# Patient Record
Sex: FEMALE | Race: BLACK OR AFRICAN AMERICAN | Employment: UNEMPLOYED | URBAN - METROPOLITAN AREA
[De-identification: names, ages, dates, MRNs, and addresses within clinical notes are randomized per-mention and may not be internally consistent; named-entity substitution may affect disease eponyms.]

---

## 2019-01-29 LAB
ANTIBODY SCREEN, EXTERNAL: NEGATIVE
CHLAMYDIA, EXTERNAL: NEGATIVE
HBSAG, EXTERNAL: NEGATIVE
HCT, EXTERNAL: 35.4
HEPATITIS C AB,   EXT: NEGATIVE
HGB, EXTERNAL: 11.5
HIV, EXTERNAL: NEGATIVE
N. GONORRHEA, EXTERNAL: NEGATIVE
PLATELET CNT,   EXTERNAL: 290
RPR, EXTERNAL: NEGATIVE
RUBELLA, EXTERNAL: NORMAL
TYPE, ABO & RH, EXTERNAL: NORMAL
URINALYSIS, EXTERNAL: NEGATIVE

## 2019-06-06 LAB — GRBS, EXTERNAL: NEGATIVE

## 2019-06-11 ENCOUNTER — HOSPITAL ENCOUNTER (INPATIENT)
Age: 24
LOS: 3 days | Discharge: HOME | DRG: 560 | End: 2019-06-14
Attending: OBSTETRICS & GYNECOLOGY | Admitting: SPECIALIST
Payer: COMMERCIAL

## 2019-06-11 PROBLEM — O40.9XX0 POLYHYDRAMNIOS: Status: ACTIVE | Noted: 2019-06-11

## 2019-06-11 PROBLEM — Z34.90 ENCOUNTER FOR INDUCTION OF LABOR: Status: ACTIVE | Noted: 2019-06-11

## 2019-06-11 PROBLEM — F32.A DEPRESSIVE DISORDER: Status: ACTIVE | Noted: 2019-06-11

## 2019-06-11 PROBLEM — O09.30 LIMITED PRENATAL CARE: Status: ACTIVE | Noted: 2019-06-11

## 2019-06-11 LAB
ABO + RH BLD: NORMAL
BASOPHILS # BLD: 0 K/UL (ref 0–0.1)
BASOPHILS NFR BLD: 0 % (ref 0–2)
BLOOD GROUP ANTIBODIES SERPL: NORMAL
DIFFERENTIAL METHOD BLD: ABNORMAL
EOSINOPHIL # BLD: 0.1 K/UL (ref 0–0.4)
EOSINOPHIL NFR BLD: 1 % (ref 0–5)
ERYTHROCYTE [DISTWIDTH] IN BLOOD BY AUTOMATED COUNT: 13.7 % (ref 11.6–14.5)
HCT VFR BLD AUTO: 34.5 % (ref 35–45)
HGB BLD-MCNC: 11.3 G/DL (ref 12–16)
LYMPHOCYTES # BLD: 1.8 K/UL (ref 0.9–3.6)
LYMPHOCYTES NFR BLD: 26 % (ref 21–52)
MCH RBC QN AUTO: 29 PG (ref 24–34)
MCHC RBC AUTO-ENTMCNC: 32.8 G/DL (ref 31–37)
MCV RBC AUTO: 88.5 FL (ref 74–97)
MONOCYTES # BLD: 0.8 K/UL (ref 0.05–1.2)
MONOCYTES NFR BLD: 11 % (ref 3–10)
NEUTS SEG # BLD: 4.3 K/UL (ref 1.8–8)
NEUTS SEG NFR BLD: 62 % (ref 40–73)
PLATELET # BLD AUTO: 225 K/UL (ref 135–420)
PMV BLD AUTO: 9.3 FL (ref 9.2–11.8)
RBC # BLD AUTO: 3.9 M/UL (ref 4.2–5.3)
SPECIMEN EXP DATE BLD: NORMAL
WBC # BLD AUTO: 7 K/UL (ref 4.6–13.2)

## 2019-06-11 PROCEDURE — 74011250636 HC RX REV CODE- 250/636: Performed by: ADVANCED PRACTICE MIDWIFE

## 2019-06-11 PROCEDURE — 86900 BLOOD TYPING SEROLOGIC ABO: CPT

## 2019-06-11 PROCEDURE — 85025 COMPLETE CBC W/AUTO DIFF WBC: CPT

## 2019-06-11 PROCEDURE — 4A0HXCZ MEASUREMENT OF PRODUCTS OF CONCEPTION, CARDIAC RATE, EXTERNAL APPROACH: ICD-10-PCS | Performed by: SPECIALIST

## 2019-06-11 PROCEDURE — 3E033VJ INTRODUCTION OF OTHER HORMONE INTO PERIPHERAL VEIN, PERCUTANEOUS APPROACH: ICD-10-PCS | Performed by: SPECIALIST

## 2019-06-11 PROCEDURE — 65270000029 HC RM PRIVATE

## 2019-06-11 RX ORDER — LIDOCAINE HYDROCHLORIDE 10 MG/ML
20 INJECTION, SOLUTION EPIDURAL; INFILTRATION; INTRACAUDAL; PERINEURAL AS NEEDED
Status: DISCONTINUED | OUTPATIENT
Start: 2019-06-11 | End: 2019-06-12 | Stop reason: HOSPADM

## 2019-06-11 RX ORDER — SERTRALINE HYDROCHLORIDE 50 MG/1
50 TABLET, FILM COATED ORAL DAILY
COMMUNITY

## 2019-06-11 RX ORDER — MISOPROSTOL 200 UG/1
800 TABLET ORAL
Status: DISCONTINUED | OUTPATIENT
Start: 2019-06-11 | End: 2019-06-12 | Stop reason: HOSPADM

## 2019-06-11 RX ORDER — DIPHENHYDRAMINE HYDROCHLORIDE 50 MG/ML
25 INJECTION, SOLUTION INTRAMUSCULAR; INTRAVENOUS ONCE
Status: COMPLETED | OUTPATIENT
Start: 2019-06-11 | End: 2019-06-11

## 2019-06-11 RX ORDER — SODIUM CHLORIDE, SODIUM LACTATE, POTASSIUM CHLORIDE, CALCIUM CHLORIDE 600; 310; 30; 20 MG/100ML; MG/100ML; MG/100ML; MG/100ML
125 INJECTION, SOLUTION INTRAVENOUS CONTINUOUS
Status: DISCONTINUED | OUTPATIENT
Start: 2019-06-11 | End: 2019-06-12 | Stop reason: HOSPADM

## 2019-06-11 RX ORDER — RANITIDINE 150 MG/1
150 TABLET, FILM COATED ORAL DAILY
Status: DISCONTINUED | OUTPATIENT
Start: 2019-06-12 | End: 2019-06-13

## 2019-06-11 RX ORDER — CARBOPROST TROMETHAMINE 250 UG/ML
250 INJECTION, SOLUTION INTRAMUSCULAR
Status: DISCONTINUED | OUTPATIENT
Start: 2019-06-11 | End: 2019-06-12 | Stop reason: HOSPADM

## 2019-06-11 RX ORDER — NALBUPHINE HYDROCHLORIDE 10 MG/ML
10 INJECTION, SOLUTION INTRAMUSCULAR; INTRAVENOUS; SUBCUTANEOUS
Status: DISCONTINUED | OUTPATIENT
Start: 2019-06-11 | End: 2019-06-12 | Stop reason: HOSPADM

## 2019-06-11 RX ORDER — SERTRALINE HYDROCHLORIDE 50 MG/1
50 TABLET, FILM COATED ORAL EVERY EVENING
Status: DISCONTINUED | OUTPATIENT
Start: 2019-06-11 | End: 2019-06-11

## 2019-06-11 RX ORDER — RANITIDINE 150 MG/1
150 TABLET, FILM COATED ORAL 2 TIMES DAILY
COMMUNITY
End: 2019-06-14

## 2019-06-11 RX ORDER — FAMOTIDINE 20 MG/1
20 TABLET, FILM COATED ORAL DAILY
Status: DISCONTINUED | OUTPATIENT
Start: 2019-06-12 | End: 2019-06-11

## 2019-06-11 RX ORDER — METHYLERGONOVINE MALEATE 0.2 MG/ML
0.2 INJECTION INTRAVENOUS AS NEEDED
Status: COMPLETED | OUTPATIENT
Start: 2019-06-11 | End: 2019-06-12

## 2019-06-11 RX ORDER — OXYTOCIN/RINGER'S LACTATE 20/1000 ML
125 PLASTIC BAG, INJECTION (ML) INTRAVENOUS CONTINUOUS
Status: DISCONTINUED | OUTPATIENT
Start: 2019-06-11 | End: 2019-06-12 | Stop reason: HOSPADM

## 2019-06-11 RX ORDER — TERBUTALINE SULFATE 1 MG/ML
0.25 INJECTION SUBCUTANEOUS
Status: DISCONTINUED | OUTPATIENT
Start: 2019-06-11 | End: 2019-06-12 | Stop reason: HOSPADM

## 2019-06-11 RX ORDER — OXYTOCIN/0.9 % SODIUM CHLORIDE 30/500 ML
0-6 PLASTIC BAG, INJECTION (ML) INTRAVENOUS
Status: DISCONTINUED | OUTPATIENT
Start: 2019-06-11 | End: 2019-06-12

## 2019-06-11 RX ORDER — OXYTOCIN/RINGER'S LACTATE 20/1000 ML
999 PLASTIC BAG, INJECTION (ML) INTRAVENOUS ONCE
Status: ACTIVE | OUTPATIENT
Start: 2019-06-11 | End: 2019-06-12

## 2019-06-11 RX ORDER — SALICYLIC ACID
90 POWDER (GRAM) MISCELLANEOUS ONCE
Status: DISPENSED | OUTPATIENT
Start: 2019-06-11 | End: 2019-06-12

## 2019-06-11 RX ORDER — SERTRALINE HYDROCHLORIDE 50 MG/1
50 TABLET, FILM COATED ORAL DAILY
Status: DISCONTINUED | OUTPATIENT
Start: 2019-06-11 | End: 2019-06-13

## 2019-06-11 RX ADMIN — DIPHENHYDRAMINE HYDROCHLORIDE 25 MG: 50 INJECTION, SOLUTION INTRAMUSCULAR; INTRAVENOUS at 21:27

## 2019-06-11 RX ADMIN — SERTRALINE HYDROCHLORIDE 50 MG: 50 TABLET, FILM COATED ORAL at 21:27

## 2019-06-11 RX ADMIN — SODIUM CHLORIDE, SODIUM LACTATE, POTASSIUM CHLORIDE, AND CALCIUM CHLORIDE 125 ML/HR: 600; 310; 30; 20 INJECTION, SOLUTION INTRAVENOUS at 19:47

## 2019-06-11 RX ADMIN — OXYTOCIN-SODIUM CHLORIDE 0.9% IV SOLN 30 UNIT/500ML 1 MILLI-UNITS/MIN: 30-0.9/5 SOLUTION at 20:20

## 2019-06-11 NOTE — H&P
History & Physical    Name: Juliette Sawyer MRN: [id number]  SSN: [id number]    YOB: 1995  Age: 25 y.o. Sex: female        Subjective:       Estimated Date of Delivery: 19  OB History        2    Para    0    Term    0        0    AB    1    Living    0       SAB    1    TAB    0    Ectopic    0    Molar    0    Multiple    0    Live Births    0             SAB at 8wks    OB HISTORY    Ms. Bobby Johns is admitted with pregnancy at 37w1d for induction of labor. Prenatal course was complicated by fetal growth restriction 6th% at 27wk, 8th% at 37wk per [facility] US; polyhydramnios, MARIIA 10 at 37wks; abnormal uterine artery doppler studies, and depression, on zoloft 50mg since 27wks and followed by therapist. Prenatal care has been followed by Tiffanie Sands starting at [facility]. Total wt gain 25lbs. Admitted to [phone]. She is accompanied by spouse, Crista Jiang. Ana Shin was seen in the office today, [facility] US and recommendations reviewed, and decision to proceed with induction was discussed by Dr. Adilene Owen. Cook balloon and low dose pitocin was explained and pt in agreement. Past Medical History:   Diagnosis Date    Psychiatric problem     depression, taking zoloft     History reviewed. No pertinent surgical history. Social History     Occupational History    Not on file   Tobacco Use    Smoking status: Never Smoker    Smokeless tobacco: Never Used   Substance and Sexual Activity    Alcohol use: Not Currently    Drug use: Never    Sexual activity: Yes     History reviewed. No pertinent family history. No Known Allergies  Prior to Admission medications    Medication Sig Start Date End Date Taking? Authorizing Provider   sertraline (ZOLOFT) 50 mg tablet Take 50 mg by mouth daily. Yes Provider, Historical   raNITIdine (ZANTAC) 150 mg tablet Take 150 mg by mouth two (2) times a day. Yes Provider, Historical   PNV No12-Iron-FA-DSS-OM-3 29 mg iron-1 mg -50 mg CPKD Take  by mouth.    Yes Provider, Historical        Review of Systems: Pertinent items are noted in HPI. Objective:     Vitals:  Vitals:    19 1823 19 1831   BP: 120/76    Pulse: 98    Resp: 17    Temp: 98.6 °F (37 °C)    SpO2: 99%    Weight:  81.6 kg (180 lb)   Height:  5' 1\" (1.549 m)        Physical Exam:  Patient in no acute distress  Abdomen: Gravid, nontender  Cervix: 2/100/-3   FHR:Baseline: 140 per minute  Variability: moderate  Accelerations: yes  Decelerations: none  Contractions: irregular  EFW  4.5# by Leopold's    Prenatal Labs:   No results found for: RUBELLAEXT, GRBSEXT, HBSAGEXT, HIVEXT, RPREXT, GONNOEXT, CHLAMEXT    Group B Strep was negative. Assessment/Plan:   Assessment: IUP @ 37w1d; FHT Category ; Vertex presentation. Patient Active Problem List   Diagnosis Code    Polyhydramnios O40. 9XX0    Fetal growth restriction [id number]    Depressive disorder F32.9    Limited prenatal care O09.30    Encounter for induction of labor Z34.90       Plan: Admit for IOL for IUGR and polyhydramnios. Sami Lucio PIV LR and labs. Cook balloon and low dose pitocin titrated per protocol for up to 12hours. Epidural as desired. Anticipate . Dr. Narendra Beatty notified.     Signed By:  Rocky Robertson CNM     2019 6:50 PM

## 2019-06-11 NOTE — PROGRESS NOTES
1815- well nourished, ambulatory patient arrived on unit for scheduled induction. Oriented patient to room. Monitors applied. Vitals stable. patient denies headache, blurry vision, leaking of fluid or bleeding. Side rails up call bell in reach. 1835- blood specimens sent to lab. IV started. patient tolerated well. 1908- Verbal shift change report given to RL Newman RN (oncoming nurse) by Rigo Page RN (offgoing nurse). Report included the following information Intake/Output, MAR, Recent Results and Med Rec Status. 1910- notified [facility] about reflexes.

## 2019-06-11 NOTE — PROGRESS NOTES
Faisal Sampson  elected to proceed with cervical ripening tonight. Reviewed placement of balloon, mechanics of agent and intended effect, R/B/A. Vertex presentation confirmed by bedside US. Rhonda Bowen requesting medication for sleep. Physical Exam:  Cervical Exam: 2/100/-3, soft, bulging bag  Membranes:  Intact  Uterine Activity:   Fetal Heart Rate: Baseline: 140 per minute  Variability: moderate  Accelerations: yes  Decelerations: none     Assessment: IUP 39w6d; FHR Category I; IOL for IUGR and poly  . Plan: During digital cervical exam, cook balloon threaded into cervix manually with stylet. 40cc NS placed in uterine balloon. Vaginal ballon visible. 40cc placed in vaginal balloon. This was repeated for the uterine balloon and then vaginal balloon for total of 60/60 cc. Pt and fetus tolerated well. Low dose pitocin titration ordered. Plan to remove balloon in 12 hours and restart pitocin at that time. Dr. Amadeo Soulier given report.

## 2019-06-11 NOTE — PROGRESS NOTES
Bedside and Verbal shift change report given to Rodri Alexis RN (oncoming nurse) by Leon Bernstein. Yannick Leslie (offgoing nurse). Report included the following information SBAR, Kardex, Intake/Output, MAR and Recent Results. 1926-- CNM at bedside w/ ultrasound; confirmed vertex  1933-- SVE: 2/100/-3  1937-- Cook balloon inserted by CNM; inflated to 60/60 w/ sterile fluid  0238-- SROM for copious clear fluid  0240-- cook balloon fell out onto floor  0256-- SVE: 4/80/-2. Updated CNM on SROM, balloon fell out, and SVE with early decelerations and variables in FHR. CNM states putting in a new Pitocin order and pt may have epidural when she desires. 0335-- Pt requests epidural; bolus started at this time  0345-- nausea/vomiting  0350-- RN remains at bedside adjusting EFM, called for second RN, bolus started, changing positions  0354-- O2 applied via non-rebreather mask at 10L/min; called for CNM  0356-- MELISSA Morel at bedside for SVE: 5/100/-2. CNM made aware fluid bolus in progress in preparation for epidural. O2 removed at this time  0358-- Pitocin turned off (see MAR)  0405-- nausea/vomiting   0415-- Notified CRNA of pt's desire for epidural  0429-- HODA Longo at bedside for epidural placement  0431-- time out  0440-- epidural line in place and test dose given by CRNA  8914-- Repositioning patient d/t deep variables; second RN at bedside; O2 reapplied via non-rebreather mask at 10L/min  0335-- CNM at bedside for SVE: pt complete. CNM states infant's head is still high, will reposition and attempt to let the head come down further before pushing. 0540-- IFM placed by CNM for difficulty tracing EFM. 9204-- turn to left side  0542-- turn to right side  0543-- RN and CNM assist pt to hands and knees  0545-- Shonda Bautista RN paging Dr. Zenon Tony per CARLOS DINHM's request.  5350-- Dr. Zenon Tony at bedside  0997-- terbutaline given per providers' request (see MAR)  Emma Kohler-- Dr. Zenon Tony rotating infant.  Called for Nursery and Peds  0602-- vacuum applied by Dr. Jcarlos Vergara. Vacuum removed by Dr. Jcarlos Vergara. Total time of application 40 seconds. Nursery RN and Pediatrician at bedside  1617-- Tight nuchal cord clamped and cut by Dr. Jcarlos Vergara.  of viable female infant. Infant to warmer to be assessed by Peds  0610-- methergine given per Dr. Aguirre Paskenta request  0329-- Two 10unit vials added to  bag of postpartum pitocin (see MAR)  0615-- Dr. Jcarlos Vergara states . Perineum intact  0620-- Fundus firm, lochia small  1620-- heavy gush of lochia and clots with fundal expression. Fundus remains firm. 2612-- fundus firm; lochia small-moderate  0707-- Bedside and Verbal shift change report given to RUPERTO Boland RN (oncoming nurse) by Yessi Dejesus RN (offgoing nurse). Report included the following information SBAR, Kardex, Intake/Output, MAR and Recent Results.

## 2019-06-12 ENCOUNTER — ANESTHESIA EVENT (OUTPATIENT)
Dept: LABOR AND DELIVERY | Age: 24
DRG: 560 | End: 2019-06-12
Payer: COMMERCIAL

## 2019-06-12 ENCOUNTER — ANESTHESIA (OUTPATIENT)
Dept: LABOR AND DELIVERY | Age: 24
DRG: 560 | End: 2019-06-12
Payer: COMMERCIAL

## 2019-06-12 PROCEDURE — 77010026065 HC OXYGEN MINIMUM MEDICAL AIR

## 2019-06-12 PROCEDURE — 59200 INSERT CERVICAL DILATOR: CPT

## 2019-06-12 PROCEDURE — 65270000029 HC RM PRIVATE

## 2019-06-12 PROCEDURE — 76060000078 HC EPIDURAL ANESTHESIA

## 2019-06-12 PROCEDURE — 88307 TISSUE EXAM BY PATHOLOGIST: CPT

## 2019-06-12 PROCEDURE — 75410000000 HC DELIVERY VAGINAL/SINGLE

## 2019-06-12 PROCEDURE — 74011250636 HC RX REV CODE- 250/636: Performed by: ADVANCED PRACTICE MIDWIFE

## 2019-06-12 PROCEDURE — 75410000002 HC LABOR FEE PER 1 HR

## 2019-06-12 PROCEDURE — 74011000250 HC RX REV CODE- 250

## 2019-06-12 PROCEDURE — 77030034849

## 2019-06-12 PROCEDURE — 75410000003 HC RECOV DEL/VAG/CSECN EA 0.5 HR

## 2019-06-12 PROCEDURE — 77030007879 HC KT SPN EPDRL TELE -B: Performed by: NURSE ANESTHETIST, CERTIFIED REGISTERED

## 2019-06-12 PROCEDURE — 74011250636 HC RX REV CODE- 250/636

## 2019-06-12 PROCEDURE — 74011250636 HC RX REV CODE- 250/636: Performed by: NURSE ANESTHETIST, CERTIFIED REGISTERED

## 2019-06-12 RX ORDER — OXYTOCIN 10 [USP'U]/ML
INJECTION, SOLUTION INTRAMUSCULAR; INTRAVENOUS
Status: COMPLETED
Start: 2019-06-12 | End: 2019-06-12

## 2019-06-12 RX ORDER — ZOLPIDEM TARTRATE 5 MG/1
5 TABLET ORAL
Status: DISCONTINUED | OUTPATIENT
Start: 2019-06-12 | End: 2019-06-14 | Stop reason: HOSPADM

## 2019-06-12 RX ORDER — OXYTOCIN 10 [USP'U]/ML
10 INJECTION, SOLUTION INTRAMUSCULAR; INTRAVENOUS ONCE
Status: COMPLETED | OUTPATIENT
Start: 2019-06-12 | End: 2019-06-12

## 2019-06-12 RX ORDER — OXYCODONE AND ACETAMINOPHEN 5; 325 MG/1; MG/1
2 TABLET ORAL
Status: DISCONTINUED | OUTPATIENT
Start: 2019-06-12 | End: 2019-06-14 | Stop reason: HOSPADM

## 2019-06-12 RX ORDER — SODIUM CHLORIDE 0.9 % (FLUSH) 0.9 %
5-40 SYRINGE (ML) INJECTION AS NEEDED
Status: DISCONTINUED | OUTPATIENT
Start: 2019-06-12 | End: 2019-06-12 | Stop reason: HOSPADM

## 2019-06-12 RX ORDER — AMOXICILLIN 250 MG
1 CAPSULE ORAL
Status: DISCONTINUED | OUTPATIENT
Start: 2019-06-12 | End: 2019-06-14 | Stop reason: HOSPADM

## 2019-06-12 RX ORDER — LIDOCAINE HYDROCHLORIDE AND EPINEPHRINE 15; 5 MG/ML; UG/ML
INJECTION, SOLUTION EPIDURAL
Status: COMPLETED | OUTPATIENT
Start: 2019-06-12 | End: 2019-06-12

## 2019-06-12 RX ORDER — PROMETHAZINE HYDROCHLORIDE 25 MG/ML
25 INJECTION, SOLUTION INTRAMUSCULAR; INTRAVENOUS
Status: DISCONTINUED | OUTPATIENT
Start: 2019-06-12 | End: 2019-06-14 | Stop reason: HOSPADM

## 2019-06-12 RX ORDER — IBUPROFEN 400 MG/1
800 TABLET ORAL
Status: DISCONTINUED | OUTPATIENT
Start: 2019-06-12 | End: 2019-06-14 | Stop reason: HOSPADM

## 2019-06-12 RX ORDER — LIDOCAINE HYDROCHLORIDE AND EPINEPHRINE 15; 5 MG/ML; UG/ML
INJECTION, SOLUTION EPIDURAL AS NEEDED
Status: DISCONTINUED | OUTPATIENT
Start: 2019-06-12 | End: 2019-06-12

## 2019-06-12 RX ORDER — BUPIVACAINE HYDROCHLORIDE 2.5 MG/ML
INJECTION, SOLUTION EPIDURAL; INFILTRATION; INTRACAUDAL AS NEEDED
Status: DISCONTINUED | OUTPATIENT
Start: 2019-06-12 | End: 2019-06-12 | Stop reason: HOSPADM

## 2019-06-12 RX ORDER — OXYTOCIN/0.9 % SODIUM CHLORIDE 30/500 ML
0-20 PLASTIC BAG, INJECTION (ML) INTRAVENOUS
Status: DISCONTINUED | OUTPATIENT
Start: 2019-06-12 | End: 2019-06-12 | Stop reason: HOSPADM

## 2019-06-12 RX ORDER — PHENYLEPHRINE HCL IN 0.9% NACL 0.4MG/10ML
100 SYRINGE (ML) INTRAVENOUS AS NEEDED
Status: DISCONTINUED | OUTPATIENT
Start: 2019-06-12 | End: 2019-06-12 | Stop reason: HOSPADM

## 2019-06-12 RX ORDER — ACETAMINOPHEN 325 MG/1
650 TABLET ORAL
Status: DISCONTINUED | OUTPATIENT
Start: 2019-06-12 | End: 2019-06-14 | Stop reason: HOSPADM

## 2019-06-12 RX ORDER — SODIUM CHLORIDE 0.9 % (FLUSH) 0.9 %
5-40 SYRINGE (ML) INJECTION EVERY 8 HOURS
Status: DISCONTINUED | OUTPATIENT
Start: 2019-06-12 | End: 2019-06-12 | Stop reason: HOSPADM

## 2019-06-12 RX ORDER — FENTANYL CITRATE 50 UG/ML
100 INJECTION, SOLUTION INTRAMUSCULAR; INTRAVENOUS ONCE
Status: COMPLETED | OUTPATIENT
Start: 2019-06-12 | End: 2019-06-12

## 2019-06-12 RX ORDER — FENTANYL CITRATE 50 UG/ML
INJECTION, SOLUTION INTRAMUSCULAR; INTRAVENOUS
Status: COMPLETED
Start: 2019-06-12 | End: 2019-06-12

## 2019-06-12 RX ADMIN — FENTANYL CITRATE 100 MCG: 50 INJECTION, SOLUTION INTRAMUSCULAR; INTRAVENOUS at 04:42

## 2019-06-12 RX ADMIN — OXYTOCIN 10 UNITS: 10 INJECTION, SOLUTION INTRAMUSCULAR; INTRAVENOUS at 06:12

## 2019-06-12 RX ADMIN — SODIUM CHLORIDE, SODIUM LACTATE, POTASSIUM CHLORIDE, AND CALCIUM CHLORIDE 1000 ML: 600; 310; 30; 20 INJECTION, SOLUTION INTRAVENOUS at 04:19

## 2019-06-12 RX ADMIN — LIDOCAINE HYDROCHLORIDE AND EPINEPHRINE 3.5 ML: 15; 5 INJECTION, SOLUTION EPIDURAL at 04:40

## 2019-06-12 RX ADMIN — SODIUM CHLORIDE, SODIUM LACTATE, POTASSIUM CHLORIDE, AND CALCIUM CHLORIDE 125 ML/HR: 600; 310; 30; 20 INJECTION, SOLUTION INTRAVENOUS at 05:56

## 2019-06-12 RX ADMIN — METHYLERGONOVINE MALEATE 0.2 MG: 0.2 INJECTION, SOLUTION INTRAMUSCULAR; INTRAVENOUS at 06:13

## 2019-06-12 RX ADMIN — Medication 10 ML/HR: at 04:47

## 2019-06-12 RX ADMIN — Medication 125 ML/HR: at 06:12

## 2019-06-12 RX ADMIN — SODIUM CHLORIDE, SODIUM LACTATE, POTASSIUM CHLORIDE, AND CALCIUM CHLORIDE 125 ML/HR: 600; 310; 30; 20 INJECTION, SOLUTION INTRAVENOUS at 03:19

## 2019-06-12 RX ADMIN — TERBUTALINE SULFATE 0.25 MG: 1 INJECTION SUBCUTANEOUS at 05:57

## 2019-06-12 RX ADMIN — BUPIVACAINE HYDROCHLORIDE 8 ML: 2.5 INJECTION, SOLUTION EPIDURAL; INFILTRATION; INTRACAUDAL at 04:38

## 2019-06-12 NOTE — ANESTHESIA POSTPROCEDURE EVALUATION
* No procedures listed *.    epidural    Anesthesia Post Evaluation      Multimodal analgesia: multimodal analgesia not used between 6 hours prior to anesthesia start to PACU discharge  Patient location during evaluation: bedside  Patient participation: complete - patient participated  Level of consciousness: awake  Pain score: 0  Pain management: adequate  Airway patency: patent  Anesthetic complications: no  Cardiovascular status: acceptable  Respiratory status: acceptable  Hydration status: acceptable  Post anesthesia nausea and vomiting:  none      No vitals data found for the desired time range.

## 2019-06-12 NOTE — PROGRESS NOTES
Labor Progress Note  Patient seen, fetal heart rate and contraction pattern evaluated, and patient examined. Report from RN of SROM, copious amount of clear fluid and removal of cook balloon. Patient Vitals for the past 1 hrs:   Temp   19 0256 98 °F (36.7 °C)       Physical Exam:  Cervical Exam:  5/100/-2/   Membranes:  Spontaneous Rupture of Membranes; Amniotic Fluid: clear fluid  Uterine Activity: Frequency: Every 2 minutes and Duration: 60 seconds  Fetal Heart Rate: Baseline: 140 per minute  Variability: moderate  Accelerations: no  Decelerations: early    Assessment: IUP 37w2d; FHR Category I; IOL for IUGR and poly  Plan: Continue to monitor for maternal and fetal wellbeing, pitocin titrated per protocol, epidural as desired, anticipate .   Bianka Pollack CNM  2019  3:52 AM

## 2019-06-12 NOTE — ANESTHESIA PREPROCEDURE EVALUATION
Relevant Problems   No relevant active problems       Anesthetic History   No history of anesthetic complications            Review of Systems / Medical History  Patient summary reviewed, nursing notes reviewed and pertinent labs reviewed    Pulmonary  Within defined limits                 Neuro/Psych   Within defined limits           Cardiovascular  Within defined limits                     GI/Hepatic/Renal  Within defined limits              Endo/Other  Within defined limits           Other Findings              Physical Exam    Airway  Mallampati: II  TM Distance: 4 - 6 cm  Neck ROM: normal range of motion   Mouth opening: Normal     Cardiovascular  Regular rate and rhythm,  S1 and S2 normal,  no murmur, click, rub, or gallop  Rhythm: regular  Rate: normal         Dental  No notable dental hx       Pulmonary  Breath sounds clear to auscultation               Abdominal  Abdominal exam normal       Other Findings            Anesthetic Plan    ASA: 2  Anesthesia type: epidural            Anesthetic plan and risks discussed with: Patient and Spouse

## 2019-06-12 NOTE — PROGRESS NOTES
Bedside and Verbal shift change report given to Reuben (oncoming nurse) by Cj Turk (offgoing nurse). Report included the following information SBAR, Kardex, Procedure Summary, Intake/Output, MAR, Recent Results and Med Rec Status. 0800- large amount of urine expressed with fundal massage. 0935- Pt up to bathroom, unable to void at this time. Pericare done with instruction. Pad and gown changed.  Pt transferred to 3412-   1000- pt to nursery to visit with baby

## 2019-06-12 NOTE — ANESTHESIA PROCEDURE NOTES
Epidural Block    Start time: 6/12/2019 4:25 AM  End time: 6/12/2019 4:45 AM  Performed by: Leatha Ha CRNA  Authorized by: Leatha Ha CRNA     Pre-Procedure  Indication: labor epidural    Preanesthetic Checklist: patient identified, risks and benefits discussed, anesthesia consent, site marked, patient being monitored, timeout performed and anesthesia consent    Timeout Time: 04:31        Epidural:   Patient position:  Seated  Prep region:  Lumbar  Prep: Betadine    Location:  L3-4    Needle and Epidural Catheter:   Needle Type:  Tuohy  Needle Gauge:  18 G  Injection Technique:  Loss of resistance using air  Attempts:  1  Catheter Size:  19 G  Catheter at Skin Depth (cm):  8  Depth in Epidural Space (cm):  4  Events: no blood with aspiration, no cerebrospinal fluid with aspiration, no paresthesia and negative aspiration test    Test Dose:  Negative    Assessment:   Catheter Secured:  Tegaderm and tape  Insertion:  Uncomplicated  Patient tolerance:  Patient tolerated the procedure well with no immediate complications  Fentanyl 474 mcg via Epidural cath given.

## 2019-06-12 NOTE — L&D DELIVERY NOTE
[address] for Birth   Delivery Note    Patient's Name:  Wanda Dillard. MRN: [id number]. Patient's : 1995. Age: 25 y. o. Date of Service:  2019  6:52 AM    Physician:  Claudis Phalen, MD    Delivered at 37w2d weeks gestation. Known IUGR patient. Called to review strip. Intermittent bradycardia with good variability noted for 45 minutes. Exam revealed Right Occiput posterior. Terbutal given and patient in knee chest.  Repositioned patient to Gita position. confirmed ROP and manually rotated to ANA CRISTINA. Vacuum applied and with approximately 5 pushing efforts patient delivered vigorous female infant over intact perineum. Peds in room. Nuchal cord tight, clamped and cut prior to delivery. Postpartum bleeding heavy managed with doubling pitocin, massage and 0.2 mg IM methergine. DELIVERY SUMMARY:  Information for the patient's :  Ashlyn Mehul [[id number]]     Delivery Type: Vaginal, Vacuum (Extractor)   Delivery Date: 2019   Delivery Time: 6:03 AM     Birth Weight:       Sex:  female  Delivery Clinician:  Ron Bonilla   Gestational Age: 42w2d    Presentation: Vertex   Position:             Apgars were   and       Resuscitation Method:       Meconium Stained:      Living Status:         Placenta Date/Time:     Placenta Removal: Spontaneous   Placenta Appearance: Normal;Intact    Cord Information: 3 Vessels    Cord Events: Nuchal Cord With Compressions       Disposition of Cord Blood:      Blood Gases Sent?:        Episiotomy:no  Laceration:no  Repair:no  Epidural:yes    Vital Signs and Lab Data:   Visit Vitals  BP 99/51   Pulse 81   Temp 98.2 °F (36.8 °C)   Resp 17   Ht 5' 1\" (1.549 m)   Wt 81.6 kg (180 lb)   SpO2 99%   Breastfeeding?  No   BMI 34.01 kg/m²       Temp (24hrs), Av.4 °F (36.9 °C), Min:98 °F (36.7 °C), Max:98.7 °F (37.1 °C)      WBC   Date/Time Value Ref Range Status   2019 07:35 PM 7.0 4.6 - 13.2 K/uL Final     HGB   Date/Time Value Ref Range Status   06/11/2019 07:35 PM 11.3 (L) 12.0 - 16.0 g/dL Final     PLATELET   Date/Time Value Ref Range Status   06/11/2019 07:35  135 - 420 K/uL Final     Hgb, External   Date/Time Value Ref Range Status   01/29/2019 11.5  Final     Platelet cnt., External   Date/Time Value Ref Range Status   01/29/2019 290  Final       Prenatal Labs:  Lab Results   Component Value Date/Time    ABO/Rh(D) A POSITIVE 06/11/2019 07:35 PM    ABO,Rh A positive  01/29/2019    Rubella, External immune  01/29/2019    GrBStrep, External negative 06/06/2019         Plan: Normal Postpartum Care    Attending Attestation: I was present and scrubbed for the entire procedure    Signed By: Sukhwinder Meléndez MD     June 12, 2019

## 2019-06-12 NOTE — PROGRESS NOTES
Labor Progress Note  Called by RN to evaluate variable decels. Patient seen, fetal heart rate and contraction pattern evaluated, patient examined. No data found. Physical Exam:  Cervical Exam:  10/100/0  Membranes:  Spontaneous Rupture of Membranes; Amniotic Fluid: clear fluid  Uterine Activity: Frequency: Every 3 minutes and Duration: 60 seconds  Fetal Heart Rate: Baseline: 125 per minute  Variability: moderate  Accelerations: yes  Decelerations: variable decels to 70-80bpm and prolonged, intermittent return to baseline with mod variability  FSE applied    Assessment: IUP 37w2d; FHR Category III; IOL for IUGR and poly. O2 and fluid bolus. Plan: Maternal positioning in H&K, terbutaline given.  Dr. Annamarie Klein paged to assess for PCS vs VAVD  Raven Mead CNM  6/12/2019  6:21 AM

## 2019-06-12 NOTE — PROGRESS NOTES
Labor Progress Note  Patient resting, fetal heart rate and contraction pattern evaluated. Exam deferred. Patient Vitals for the past 4 hrs:   Pulse BP   19 2200 72 129/88   19 2100 70 126/81   19 2000 78 112/69         Physical Exam:  Cervical Exam:  2/100 %/-3/   Membranes:  Intact  Uterine Activity: Frequency: Every 4-5 minutes and Duration: 30-60 seconds  Fetal Heart Rate: Baseline: 140 per minute  Variability: moderate  Accelerations: yes  Decelerations: none    Assessment: IUP 37w1d; FHR Category I; IOL for IUGR and poly  Plan: Continue to monitor for maternal and fetal wellbeing, pitocin titrated per protocol, cook balloon overnight, anticipate .   Klaus Rogel CNM  2019  11:50 PM

## 2019-06-12 NOTE — PROGRESS NOTES
80: Paged Dr Maddie Cadet at request of [facility]. 0619: Dr Maddie Cadet returned page, made aware, will be by to evaluate.

## 2019-06-12 NOTE — LACTATION NOTE
Baby went to [facility] after birth for distress but is now in the room with mom. Baby is SGA and did have a small amount of formula this morning. Mom states she plans to do a breast/formula combination and when home may formula feed only. Baby was positioned football on left but baby did not wake to nurse. Reviewed  feeding patterns/expectations in the first 24 hours, positioning, colostrum, latch. Encouraged mom to ask for assistance when baby wakes and is ready to nurse.

## 2019-06-12 NOTE — ROUTINE PROCESS
TRANSFER - IN REPORT: 
 
Verbal report received from Arabella Maddox RN (name) on Daija Polo  being received from L&D (unit) for routine progression of care Report consisted of patients Situation, Background, Assessment and  
Recommendations(SBAR). Information from the following report(s) SBAR, Procedure Summary, Accordion and Recent Results was reviewed with the receiving nurse. Opportunity for questions and clarification was provided. Assessment completed upon patients arrival to unit and care assumed. 1040 - RN introduced self to pt. Pt resting in bed. Pt denies question/concerns/pain. Will continue to monitor.

## 2019-06-12 NOTE — PROGRESS NOTES
I received a case summary earlier this evening. I have reviewed the chart and agree with the documentation recorded by the Midwife, including the assessment and current treatment plan.     David Almonte MD  [facility]  Pager: [phone]  Office: [phone]  June 11, 2019 10:57 PM

## 2019-06-12 NOTE — PROGRESS NOTES
Visited with mother and acquired permission to announce baby's name.     Nathalie Mott   Spiritual Care   [phone]

## 2019-06-13 LAB
HCT VFR BLD AUTO: 32.4 % (ref 35–45)
HGB BLD-MCNC: 10.7 G/DL (ref 12–16)

## 2019-06-13 PROCEDURE — 36415 COLL VENOUS BLD VENIPUNCTURE: CPT

## 2019-06-13 PROCEDURE — 65270000029 HC RM PRIVATE

## 2019-06-13 PROCEDURE — 85018 HEMOGLOBIN: CPT

## 2019-06-13 PROCEDURE — 85014 HEMATOCRIT: CPT

## 2019-06-13 PROCEDURE — 74011250637 HC RX REV CODE- 250/637: Performed by: SPECIALIST

## 2019-06-13 RX ORDER — RANITIDINE 150 MG/1
150 TABLET, FILM COATED ORAL DAILY PRN
Status: DISCONTINUED | OUTPATIENT
Start: 2019-06-13 | End: 2019-06-14 | Stop reason: HOSPADM

## 2019-06-13 RX ORDER — SERTRALINE HYDROCHLORIDE 50 MG/1
50 TABLET, FILM COATED ORAL
Status: DISCONTINUED | OUTPATIENT
Start: 2019-06-13 | End: 2019-06-14 | Stop reason: HOSPADM

## 2019-06-13 RX ADMIN — IBUPROFEN 800 MG: 400 TABLET ORAL at 12:48

## 2019-06-13 RX ADMIN — ACETAMINOPHEN 650 MG: 325 TABLET, FILM COATED ORAL at 19:52

## 2019-06-13 RX ADMIN — SERTRALINE HYDROCHLORIDE 50 MG: 50 TABLET, FILM COATED ORAL at 23:21

## 2019-06-13 RX ADMIN — IBUPROFEN 800 MG: 400 TABLET ORAL at 01:21

## 2019-06-13 RX ADMIN — IBUPROFEN 800 MG: 400 TABLET ORAL at 22:40

## 2019-06-13 NOTE — ROUTINE PROCESS
Bedside and Verbal shift change report given to Andrzej Bojorquez RN (oncoming nurse) by Brian Mccoy RN (offgoing nurse). Report included the following information SBAR, Procedure Summary, Intake/Output, MAR and Recent Results.

## 2019-06-13 NOTE — ROUTINE PROCESS
Bedside and Verbal shift change report given to Gwen 812 (oncoming nurse) by Sri Cannon RN (offgoing nurse). Report included the following information SBAR, Intake/Output, MAR and Recent Results. 2010: Assumed care of pt resting supine in bed. Significant other at bedside. Introductions, and reveiwed plan of care for this shift. Assessment and vital signs within defined limits. Iv saline locked in left wrist wnl. Call bell on bedside, top side rails up x2, bed in lowest position. Denies needs or complaints at this time. No apparent distress noted. 2214: Ambulating in room steady gait noted, id band verifeid with infant's,. Denies needs or complaints at this time. No distress noted. 2239: Po zoloft admin per orders see mar. Denies needs or c/o at this time. 0030: Assisted with breast feeding: Infant placed in cross cradle hold: infant successfully latched and nursing. Pt denies needs or c/o nad noted. 0120: C/o pain medicated with 800mg of motrin per orders see mar. Denies needs or c/o at this time. S/o at bedside. 0400: Ambulated to bathroom with steady gait, voided, returned to bed without difficulty. Denies needs or c/o at this time. S/o at bedside. 9792: Sleeping supine respirations unlabored. S/o at bedside. No apparent distress noted. Assessment and vital signs within defined limits. Voiding and ambulating without difficulty. Pain controlled by motrin . Shift Report: Kelvin Brasher RN Pt condition: Stable

## 2019-06-13 NOTE — PROGRESS NOTES
Progress Note    Patient: Wanda Dillard MRN: [id number]  SSN: [id number]    YOB: 1995  Age: 25 y.o. Sex: female      Subjective:       Pt denies complaints. Ambulating, voiding, tolerating regular diet, minimal lochia, pain controlled with po pain meds. Breast and bottle tfeeding without difficulty. Objective:      Patient Vitals for the past 12 hrs:   Temp Pulse Resp BP SpO2   06/13/19 0400 98.2 °F (36.8 °C) 76 20 132/82 99 %       Physical Exam:  General:  Alert and oriented, no apparent distress  Abdomen:  Soft, non distended, non tender to palpation, firm fundus below umbilicus  Lower extremities bilaterally:  Non tender to palpation, no edema/cyanosis/clubbing/redness    Lab/Data Review:  Recent Results (from the past 24 hour(s))   HEMOGLOBIN    Collection Time: 06/13/19  6:50 AM   Result Value Ref Range    HGB 10.7 (L) 12.0 - 16.0 g/dL   HEMATOCRIT    Collection Time: 06/13/19  6:50 AM   Result Value Ref Range    HCT 32.4 (L) 35.0 - 45.0 %        Assessment:     25 y.o. Silvia Bonilla PPD#1 s/p VAVD    Plan:     - Postpartum care discussed including diet, ambulation, and actvitiy restrictions. - Continue to monitor closely, likely DC in am  - Recovering well.         Signed By: Marjorie Goins MD     June 13, 2019

## 2019-06-13 NOTE — ROUTINE PROCESS
Bedside and Verbal shift change report given to JAMIL Berry (oncoming nurse) by Lindy Gonzalez. Yue Carter RN (offgoing nurse). Report included the following information SBAR, Kardex, MAR and Recent Results.

## 2019-06-13 NOTE — LACTATION NOTE
Mother states baby did nurse during the evening. There was a bottle of formula at bedside so asked mom is she was doing breast and bottle and she stated yes but she told the nurse she wanted to breastfeed only. Unsure of her plan but offered assistance if she wants to nurse.

## 2019-06-14 VITALS
HEIGHT: 61 IN | BODY MASS INDEX: 33.99 KG/M2 | RESPIRATION RATE: 16 BRPM | SYSTOLIC BLOOD PRESSURE: 114 MMHG | OXYGEN SATURATION: 100 % | HEART RATE: 87 BPM | DIASTOLIC BLOOD PRESSURE: 69 MMHG | TEMPERATURE: 97.4 F | WEIGHT: 180 LBS

## 2019-06-14 PROBLEM — O40.9XX0 POLYHYDRAMNIOS: Status: RESOLVED | Noted: 2019-06-11 | Resolved: 2019-06-14

## 2019-06-14 PROBLEM — Z34.90 ENCOUNTER FOR INDUCTION OF LABOR: Status: RESOLVED | Noted: 2019-06-11 | Resolved: 2019-06-14

## 2019-06-14 PROBLEM — O09.30 LIMITED PRENATAL CARE: Status: RESOLVED | Noted: 2019-06-11 | Resolved: 2019-06-14

## 2019-06-14 PROBLEM — F32.A DEPRESSIVE DISORDER: Status: RESOLVED | Noted: 2019-06-11 | Resolved: 2019-06-14

## 2019-06-14 PROCEDURE — 74011250637 HC RX REV CODE- 250/637: Performed by: SPECIALIST

## 2019-06-14 RX ORDER — IBUPROFEN 800 MG/1
800 TABLET ORAL
Qty: 60 TAB | Refills: 0 | Status: SHIPPED | OUTPATIENT
Start: 2019-06-14

## 2019-06-14 RX ORDER — SERTRALINE HYDROCHLORIDE 50 MG/1
50 TABLET, FILM COATED ORAL
Qty: 30 TAB | Refills: 3 | Status: SHIPPED | OUTPATIENT
Start: 2019-06-14

## 2019-06-14 RX ADMIN — ACETAMINOPHEN 650 MG: 325 TABLET, FILM COATED ORAL at 04:40

## 2019-06-14 NOTE — LACTATION NOTE
Mom states she is breast and bottle feeding baby. Mom gave formula last night after nursing for 40 minutes because baby was showing signs of hunger. Discussed cluster feeding, sucking needs, size of stomach. Mom states breastfeeding \"hurts\". Discussed positions and sore nipple management - mom can express drops of colostrum. Daily log given and reviewed. Info sheet, resource list given, encouraged to call with questions.

## 2019-06-14 NOTE — DISCHARGE SUMMARY
Obstetrical Discharge Summary       STEVEN Nicholas Zavaleta MD  [address]   [address]  [address] [facility]  [phone]        Patient ID:Leena DELUCAQRORIN,[id number],9 y.o.,1995    Postpartum Day: Information for the patient's :  Sidney Wilcox [[id number]]   3 days       Admit Date: 2019    Discharge Date: 2019     Admitting Physician: Anca Souza MD     Admission Diagnoses: Encounter for induction of labor [Z34.90]    Discharge Diagnoses: same as above      Additional Diagnoses:Present on Admission:   (Resolved) Limited prenatal care       Procedure:        Baby link  Information for the patient's :  Sidney Wilcox [[id number]]     Delivery Type: Vaginal, Vacuum (Extractor)   Delivery Date: 2019   Delivery Time: 6:03 AM     Birth Weight: 2.23 kg     Sex:  female  Delivery Clinician:  Brandy Hines   Gestational Age: 42w2d    Presentation: Vertex   Position:             Apgars were 4  and 6      Resuscitation Method: C-PAP;Suctioning-bulb;Suctioning-deep; Tactile Stimulation     Meconium Stained: None    Living Status: Living       Placenta Date/Time: 2019  6:07 AM   Placenta Removal: Spontaneous   Placenta Appearance: Normal;Intact    Cord Information: 3 Vessels    Cord Events: Nuchal Cord With Compressions       Disposition of Cord Blood:      Blood Gases Sent?:            Baby procedures:    Information for the patient's newbornDriscilla Jim [[id number]]          Feeding Method: Infant Feeding: Breastmilk, Formula    Immunizations:    Immunization History   Administered Date(s) Administered    DTaP 2019        Immunizations:    Immunization History   Administered Date(s) Administered    DTaP 2019       Group Beta Strep:   GrBStrep, External   Date Value Ref Range Status   2019 negative  Final          WBC   Date/Time Value Ref Range Status   2019 07:35 PM 7.0 4.6 - 13.2 K/uL Final     HGB   Date/Time Value Ref Range Status   2019 06:50 AM 10.7 (L) 12.0 - 16.0 g/dL Final   2019 07:35 PM 11.3 (L) 12.0 - 16.0 g/dL Final     PLATELET   Date/Time Value Ref Range Status   2019 07:35  135 - 420 K/uL Final     Hgb, External   Date/Time Value Ref Range Status   2019 11.5  Final     Platelet cnt., External   Date/Time Value Ref Range Status   2019 290  Final         Hospital Course: Unremarkable  Subjective: Alex Amaya is ready to go today. Reports being very tired this morning. Dozing. Gave brief DC instructions to Alex Amaya and quietly repeated salient points to her partner. Objective:    Breasts Nontender and intact  Fundus firm and involuting  Lochia rubra, minimal  Legs without cords, neg Homans, minimal to no edema    Lab/Data Review:  Patient Vitals for the past 8 hrs:   BP Temp Pulse Resp SpO2   19 0440 134/87 97.7 °F (36.5 °C) 68 16 99 %       Temp (24hrs), Av.6 °F (36.4 °C), Min:97.5 °F (36.4 °C), Max:97.7 °F (36.5 °C)      WBC   Date/Time Value Ref Range Status   2019 07:35 PM 7.0 4.6 - 13.2 K/uL Final     HGB   Date/Time Value Ref Range Status   2019 06:50 AM 10.7 (L) 12.0 - 16.0 g/dL Final   2019 07:35 PM 11.3 (L) 12.0 - 16.0 g/dL Final     PLATELET   Date/Time Value Ref Range Status   2019 07:35  135 - 420 K/uL Final     Hgb, External   Date/Time Value Ref Range Status   2019 11.5  Final     Platelet cnt., External   Date/Time Value Ref Range Status   2019 290  Final     Patient Instructions:   Current Discharge Medication List      START taking these medications    Details   !! sertraline (ZOLOFT) 50 mg tablet Take 1 Tab by mouth nightly. Qty: 30 Tab, Refills: 3      ibuprofen (MOTRIN) 800 mg tablet Take 1 Tab by mouth every eight (8) hours as needed for Pain. Qty: 60 Tab, Refills: 0       !! - Potential duplicate medications found. Please discuss with provider.       CONTINUE these medications which have NOT CHANGED    Details   !! sertraline (ZOLOFT) 50 mg tablet Take 50 mg by mouth daily. PNV No12-Iron-FA-DSS-OM-3 29 mg iron-1 mg -50 mg CPKD Take  by mouth. !! - Potential duplicate medications found. Please discuss with provider. STOP taking these medications       raNITIdine (ZANTAC) 150 mg tablet Comments:   Reason for Stopping:               Assessment:     Status post: postpartum vaginal delivery   Recovering well  Breastfeeding      Plan:     Postpartum care discussed including diet, ambulation, and actvitiy restrictions. Discharge instructions and questions answered for vaginal delivery.   Follow in 6 wks or prn      Signed By: Priscila Goodwin CNM     June 14, 2019

## 2019-06-14 NOTE — ROUTINE PROCESS
Bedside and Verbal shift change report given to JAMIL Roberts (oncoming nurse) by Areli Alvarado. Sammy Navarro, RN (offgoing nurse). Report included the following information SBAR, Kardex, MAR and Recent Results. 1 Jose Schumacher Discharge instructions given; all questions and concerns addressed. Berenice 73 Pt discharged via wheelchair; delayed leaving due to pictures.

## 2019-06-14 NOTE — DISCHARGE INSTRUCTIONS
